# Patient Record
Sex: MALE | NOT HISPANIC OR LATINO | URBAN - METROPOLITAN AREA
[De-identification: names, ages, dates, MRNs, and addresses within clinical notes are randomized per-mention and may not be internally consistent; named-entity substitution may affect disease eponyms.]

---

## 2017-03-13 ENCOUNTER — OFFICE VISIT - HEALTHEAST (OUTPATIENT)
Dept: FAMILY MEDICINE | Facility: CLINIC | Age: 62
End: 2017-03-13

## 2017-03-13 DIAGNOSIS — J45.20 MILD INTERMITTENT ASTHMA: ICD-10-CM

## 2017-03-13 DIAGNOSIS — E66.9 OBESITY: ICD-10-CM

## 2017-03-13 DIAGNOSIS — J30.9 ALLERGIC RHINITIS: ICD-10-CM

## 2017-03-13 DIAGNOSIS — N52.9 ERECTILE DYSFUNCTION: ICD-10-CM

## 2017-03-13 DIAGNOSIS — Z00.00 WELL ADULT EXAM: ICD-10-CM

## 2017-03-13 DIAGNOSIS — Z83.49 FAMILY HISTORY OF THYROID DISEASE: ICD-10-CM

## 2017-03-13 RX ORDER — MONTELUKAST SODIUM 10 MG/1
10 TABLET ORAL AT BEDTIME
Status: SHIPPED | COMMUNITY
Start: 2017-03-13

## 2017-03-13 ASSESSMENT — MIFFLIN-ST. JEOR: SCORE: 1608.14

## 2017-03-14 LAB
CHOLEST SERPL-MCNC: 198 MG/DL
FASTING STATUS PATIENT QL REPORTED: YES
HDLC SERPL-MCNC: 46 MG/DL
LDLC SERPL CALC-MCNC: 123 MG/DL
PSA SERPL-MCNC: 1 NG/ML (ref 0–3.5)
TRIGL SERPL-MCNC: 144 MG/DL

## 2017-10-27 ENCOUNTER — COMMUNICATION - HEALTHEAST (OUTPATIENT)
Dept: FAMILY MEDICINE | Facility: CLINIC | Age: 62
End: 2017-10-27

## 2018-06-19 ENCOUNTER — COMMUNICATION - HEALTHEAST (OUTPATIENT)
Dept: FAMILY MEDICINE | Facility: CLINIC | Age: 63
End: 2018-06-19

## 2018-08-08 ENCOUNTER — COMMUNICATION - HEALTHEAST (OUTPATIENT)
Dept: FAMILY MEDICINE | Facility: CLINIC | Age: 63
End: 2018-08-08

## 2018-08-09 RX ORDER — ALBUTEROL SULFATE 90 UG/1
AEROSOL, METERED RESPIRATORY (INHALATION)
Qty: 18 INHALER | Refills: 0 | Status: SHIPPED | OUTPATIENT
Start: 2018-08-09

## 2021-05-30 VITALS — WEIGHT: 196.44 LBS | BODY MASS INDEX: 32.73 KG/M2 | HEIGHT: 65 IN

## 2021-06-09 NOTE — PROGRESS NOTES
Assessment:      Healthy male exam.      1. Well adult exam  Ambulatory referral for Colonoscopy    Basic Metabolic Panel    Lipid Cascade    PSA (Prostatic-Specific Antigen), Annual Screen   2. Mild intermittent asthma     3. Allergic rhinitis     4. Erectile dysfunction     5. Family history of thyroid disease  Thyroid Cascade   6. Obesity       The following high BMI interventions were performed this visit: encouragement to exercise and lifestyle education regarding diet     Plan:      I recommended he exercise on a regular basis and eat a healthy diet.  His asthma appears to be under good control.  He was given a refill of Viagra which has worked well for him in the past.  I filled out medical history paperwork from the fertility clinic.  This should be scanned into Epic for review if needed.     All questions answered.  Await PSA results.  Discussed healthy lifestyle modifications.  Follow up as needed.     Subjective:      Kim Franz is a 61 y.o. male who presents for an annual exam. The patient reports that there is not domestic violence in his life.  He denies having concerns regarding hearing, vision, urination, bowel movements, sleep or mood.  He describes having family history of thyroid disease and would like to be screened for this.  He has a history of erectile dysfunction, primarily difficulty maintaining erections.  He would like a refill of Viagra which has been helpful for him in the past.    Social history: , no children.  He and his wife are working through a fertility specialist to try and have children.  He reports that she was not able to attempt pregnancy after a brain bleed that required surgery.    Healthy Habits:   Regular Exercise: No not recently due to knee injury  Sunscreen Use: Yes  Healthy Diet: Yes  Dental Visits Regularly: No  Seat Belt: Yes  Sexually active: Yes  Monthly Self Testicular Exams:  No  Hemoccults: No  Flex Sig: No  Colonoscopy: No  Lipid Profile:  "No  Glucose Screen: No  Prevention of Osteoporosis: No  Last Dexa: N/A  Guns at Home:  N/A      Immunization History   Administered Date(s) Administered     Influenza, inj, historic 09/24/2016     Immunization status: Needs Tdap.    No exam data present    Current Outpatient Prescriptions   Medication Sig Dispense Refill     albuterol (VENTOLIN HFA) 90 mcg/actuation inhaler Inhale 2 puffs every 4 (four) hours as needed for wheezing.       montelukast (SINGULAIR) 10 mg tablet Take 10 mg by mouth bedtime.       No current facility-administered medications for this visit.      No past medical history on file.  No past surgical history on file.  Review of patient's allergies indicates no known allergies.  No family history on file.  Social History     Social History     Marital status:      Spouse name: N/A     Number of children: N/A     Years of education: N/A     Occupational History     Not on file.     Social History Main Topics     Smoking status: Never Smoker     Smokeless tobacco: Never Used     Alcohol use Yes     Drug use: No     Sexual activity: Not on file     Other Topics Concern     Not on file     Social History Narrative     No narrative on file       Review of Systems  General:  Denies problem  Eyes: Denies problem  Ears/Nose/Throat: Denies problem  Cardiovascular: Denies problem  Respiratory:  Denies problem  Gastrointestinal:  Denies problem  Genitourinary: Denies problem  Musculoskeletal:  Denies problem  Skin: Denies problem  Neurologic: Denies problem  Psychiatric: Denies problem  Endocrine: Denies problem  Heme/Lymphatic: Denies problem   Allergic/Immunologic: Denies problem        Objective:     Vitals:    03/13/17 1513   BP: 134/78   Pulse: 60   Resp: 16   Temp: 97.9  F (36.6  C)   TempSrc: Oral   Weight: 196 lb 7 oz (89.1 kg)   Height: 5' 4.7\" (1.643 m)     Body mass index is 32.99 kg/(m^2).    Physical  General Appearance: Alert, cooperative, no distress, appears stated age  Head: " Normocephalic, without obvious abnormality, atraumatic  Eyes: PERRL, conjunctiva/corneas clear, EOM's intact  Ears: Normal TM's and external ear canals, both ears  Nose: Nares normal, septum midline,mucosa normal, no drainage  Throat: Lips, mucosa, and tongue normal; teeth and gums normal  Neck: Supple, symmetrical, trachea midline, no adenopathy;  thyroid: not enlarged, symmetric, no tenderness/mass/nodules  Back: Symmetric, no curvature, ROM normal, no CVA tenderness  Lungs: Clear to auscultation bilaterally, respirations unlabored  Heart: Regular rate and rhythm, S1 and S2 normal, no murmur, rub, or gallop,  Abdomen: Soft, non-tender, bowel sounds active all four quadrants,  no masses, no organomegaly  Genitourinary: Penis normal. Right testis is descended. Left testis is descended.   Rectal: Prostate is smooth, nontender and not enlarged  Musculoskeletal: Normal range of motion. No joint swelling or deformity.   Extremities: Extremities normal, atraumatic, no cyanosis or edema  Skin: Skin color, texture, turgor normal, no rashes or lesions  Lymph nodes: Cervical, supraclavicular, and axillary nodes normal  Neurologic: He is alert. He has normal reflexes.   Psychiatric: He has a normal mood and affect.

## 2021-06-15 PROBLEM — J45.20 MILD INTERMITTENT ASTHMA: Status: ACTIVE | Noted: 2017-03-13

## 2021-06-15 PROBLEM — N52.9 ERECTILE DYSFUNCTION: Status: ACTIVE | Noted: 2017-03-13

## 2021-06-15 PROBLEM — E66.9 OBESITY: Status: ACTIVE | Noted: 2017-03-13

## 2021-06-15 PROBLEM — J30.9 ALLERGIC RHINITIS: Status: ACTIVE | Noted: 2017-03-13
